# Patient Record
Sex: FEMALE | Race: OTHER
[De-identification: names, ages, dates, MRNs, and addresses within clinical notes are randomized per-mention and may not be internally consistent; named-entity substitution may affect disease eponyms.]

---

## 2017-03-02 NOTE — PDOC
History of Present Illness





- General


Chief Complaint: Bite


Stated Complaint: DOG BITE, LEG


Time Seen by Provider: 03/02/17 13:42


History Source: Patient


Exam Limitations: No Limitations





- History of Present Illness


Initial Comments: 





03/02/17 14:18


CC bitten by own dog today while playing with him


Occurred: reports: just prior to arrival


Severity: reports: mild


Pain Location: reports: lower extremity





Past History





- Past Medical History


Allergies/Adverse Reactions: 


 Allergies











Allergy/AdvReac Type Severity Reaction Status Date / Time


 


No Known Allergies Allergy   Verified 03/02/17 13:14














- Surgical History


Cholecystectomy: Yes





- Psycho/Social/Smoking Cessation Hx


Anxiety: No


Suicidal Ideation: No


Smoking History: Never smoked


Have you smoked in the past 12 months: No


Information on smoking cessation initiated: No


Hx Alcohol Use: No


Drug/Substance Use Hx: No


Substance Use Type: None





**Review of Systems





- Review of Systems


Constitutional: No: Chills, Fever, Malaise


HEENTM: No: Symptoms Reported


Respiratory: Yes: Cough


ABD/GI: No: Symptoms Reported


: No: Symptoms Reported


Musculoskeletal: No: Symptoms Reported


Integumentary: Yes: Other (large dog bite left prox calf)





*Physical Exam





- Vital Signs


 Last Vital Signs











Temp Pulse Resp BP Pulse Ox


 


 97.9 F   97 H  18   159/76   100 


 


 03/02/17 13:14  03/02/17 13:14  03/02/17 13:14  03/02/17 13:14  03/02/17 13:14














- Physical Exam


General Appearance: Yes: Appropriately Dressed.  No: Apparent Distress


HEENT: positive: Pharynx Normal


Neck: positive: Supple.  negative: Rigid


Respiratory/Chest: positive: Lungs Clear, Normal Breath Sounds


Extremity: positive: Other (10 cm x 1 cm wide ope laceration anterior/ medial 

calf proximal aspect)





Procedures





- Laceration/Wound Repair


  ** Left Anterior Medial Proximal Leg


Wound Length: 7.6 to 12.5 cm


Wound Explored: no foreign body present


Wound's Depth, Shape: linear, irregular


Irrigated w/ Saline: Yes


Betadine Prep: Yes


Anesthesia: 1% Lidocaine


Amount of Anesthetic (ccs): 10


Suture Size/Type: 4:0, nylon


Number of Sutures: 15


Layer Closure: No


Sterile Dressing Applied: Yes





ED Treatment Course





- ADDITIONAL ORDERS


Additional order review: 


 Laboratory  Results











  03/02/17





  13:52


 


Urine HCG, Qual  Negative














- RADIOLOGY


Radiology Studies Ordered: 














 Category Date Time Status


 


 LEG TIB/FIB-LEFT [RAD] Stat Radiology  03/02/17 13:52 Ordered














Medical Decision Making





- Medical Decision Making





03/02/17 15:44


laceration repair done; no fracture/ FB noted on xray; placed pressure drsg now

; will suggest crutch use x 3 days; wound check in 2  in ED; Tetanus and 

augmentin started in ED





*DC/Admit/Observation/Transfer


Diagnosis at time of Disposition: 


Laceration of lower extremity


Qualifiers:


 Encounter type: initial encounter Laterality: left Qualified Code(s): S81.812A 

- Laceration without foreign body, left lower leg, initial encounter





Dog bite of lower leg


Qualifiers:


 Encounter type: initial encounter Laterality: left Qualified Code(s): S81.852A 

- Open bite, left lower leg, initial encounter; W54.0XXA - Bitten by dog, 

initial encounter





- Discharge Dispostion


Disposition: HOME


Condition at time of disposition: Stable


Admit: No





- Patient Instructions


Additional Instructions: 


 tetanus good x 10 years; wound check in ED in 2-3 days; elevate leg at all 

times; use crutches until wound check; sutures out 14 days





- Post Discharge Activity


Work/School Note:  Back to Work

## 2017-03-05 NOTE — PDOC
History of Present Illness





- General


Chief Complaint: Revisit,Wound Recheck


Stated Complaint: FOLLOW UP, LEG PAIN


Time Seen by Provider: 03/05/17 19:37


History Source: Patient


Exam Limitations: No Limitations





- History of Present Illness


Initial Comments: 





03/05/17 20:19


41-year-old female sent here for wound check secondary to her dog bite that she 

sustained 3 days prior which required sutures, tetanus and is currently on 

antibiotics. Patient states no worsening pain drainage or swelling. Patient 

also denies fever.


Timing/Duration: resolved prior to arrival


Associated Symptoms: reports: denies symptoms





Past History





- Past Medical History


Allergies/Adverse Reactions: 


 Allergies











Allergy/AdvReac Type Severity Reaction Status Date / Time


 


No Known Allergies Allergy   Verified 03/02/17 13:14











Home Medications: 


Ambulatory Orders





Amoxicillin/Potassium Clav [Augmentin 875-125 Tablet] 1 each PO BID #14 tablet 

03/02/17 











- Surgical History


Cholecystectomy: Yes





- Psycho/Social/Smoking Cessation Hx


Anxiety: No


Suicidal Ideation: No


Smoking History: Never smoked


Have you smoked in the past 12 months: No


Information on smoking cessation initiated: No


Hx Alcohol Use: No


Drug/Substance Use Hx: No


Substance Use Type: None


Patient Lives Alone: No


Lives with/in: spouse/SO





**Review of Systems





- Review of Systems


Able to Perform ROS?: Yes


Constitutional: No: Symptoms Reported


ABD/GI: No: Nausea


Musculoskeletal: No: Symptoms Reported


Integumentary: No: Symptoms Reported


Neurological: No: Symptoms reported





*Physical Exam





- Vital Signs


 Last Vital Signs











Temp Pulse Resp BP Pulse Ox


 


 98.3 F   88   18   140/74   100 


 


 03/05/17 19:22  03/05/17 19:22  03/05/17 19:22  03/05/17 19:22  03/05/17 19:22














- Physical Exam


General Appearance: Yes: Nourished, Appropriately Dressed.  No: Apparent 

Distress


Vascular Pulses: Doralis-Pedis (L): 2+


Extremity: positive: Other (Noted mildly edematous and ecchymotic repaired 

laceration to upper in her left calf no palpable fluctuance no erythema and no 

increased warmth dry blood noted to dressing. )


Integumentary: positive: Swelling, Ecchymosis


Neurologic: positive: Motor Strength 5/5 (ambulatory with crutches)





Medical Decision Making





- Medical Decision Making





03/05/17 20:21


Patient here for wound check secondary to laceration repair after sustaining a 

dog bite 3 days prior. Patient states has had no symptoms, continued to take 

antibiotics but does not often apply ice or elevate extremity.


area redressed instructions given on laceration repair supportive care





*DC/Admit/Observation/Transfer


Diagnosis at time of Disposition: 


Dog bite of lower leg


Qualifiers:


 Encounter type: subsequent encounter Laterality: left Qualified Code(s): 

S81.852D - Open bite, left lower leg, subsequent encounter; W54.0XXD - Bitten 

by dog, subsequent encounter





- Discharge Dispostion


Disposition: HOME


Condition at time of disposition: Good





- Patient Instructions


Printed Discharge Instructions:  How to Care for a Domestic Animal Bite, DI for 

Laceration Repair -- Complex Suture


Additional Instructions: 


Please keep area clean and dry .


please elevated higher than your heart when not ambulatory.


Please apply ice to the affected area for the next 2 days.


Continue antibiotics.


Return here for suture removal as previously discussed

## 2017-03-16 NOTE — PDOC
Suture Removal/Wound Check HPI





- History of Present Illness


Chief Complaint: Suture/Staple Removal(Here)


Stated Complaint: SUTURE REMOVAL


Time Seen by Provider: 03/16/17 11:32


History Source: Yes: Patient


Exam Limitations: Yes: No Limitations


Treated at: [facility]


Date of Last ED visit: 03/05/17





- Previous ED Treatment


Type of procedure performed on last visit: Yes: Other (wound check)


Tetanus Immunization: Yes: Up to Date


Antibiotics Prescribed: Yes (augmentin)





- Onset of Previous Treatment


Date of Occurence: 03/02/17


Comment:: 





03/16/17 14:22


Was seen here originally on 03/02/2017 for dog bite to her right anterior 

medial calf and received 15 sutures and the patient was then seen 03/05/2017 

for wound check. Patient reports that she has not been cleansing her wound just 

applying bacitracin twice a day and has been covering it constantly with a 

dressing not letting it air out. Denies any fever. Or any tenderness of wound 

or discharge from wound





Past History





- Past Medical History


Allergies/Adverse Reactions: 


Allergies





No Known Allergies Allergy (Verified 03/16/17 11:17)


 








Home Medications: 


Ambulatory Orders





Amoxicillin/Potassium Clav [Augmentin 875-125 Tablet] 1 each PO BID #14 tablet 

03/02/17 








General: Yes: no pertinent history





- Social History


Smoking Status: Never smoked





Suture Removal/Wound Check PE





- Physical Exam


Laceration/Wound Check Symptoms: reports: Other Comment (see under comments)


Comments: 


03/16/17 12:24


wound on left calf with 15 sutures that were placed on 03/02/2017 due to a dog 

bite by her own dog that was up-to-date with immunizations. Patient reports 

that she has not been cleaning the wound with any soap and water has just been 

applying bacitracin twice a day and been keeping the wound covered at all 

times. Wound appears moist cleansed it with Betadine and normal saline 0.9% 

removed 1 interrupted suture that was slightly medial to left of wound edge, 

wound edge was not well approximated slight gap noted and removed one other 

interrupted suture at the medial and of wound wound edge approximated. Applied 

Steri-Strips to areas where sutures were removed stopped removing sutures as 

wound does not appear that it is well-healed due to being moist and covered at 

all times. No erythema noted along wound edges no signs of infection. We'll 

give the patient will instructions and have her come back in 3 days first 

suture removal. Or 13 sutures remaining Telfa applied including





03/16/17 12:27








03/16/17 12:28





Current Severity Level: Mild


Pain Localization: None


Location of Laceration/Wound: left: Leg (anterior calf )





*Review of Systems





- Review of Systems


Constitutional: No: Symptoms Reported


HEENTM: No: Symptoms Reported


Respiratory: No: Symptoms reported


Cardiac (ROS): No: Symptoms Reported


ABD/GI: No: Symptoms Reported


: No: Symptoms Reported


Integumentary: Yes: Other (With 15 sutures left anterior/medial calf, )


Neurological: No: Symptoms reported





Procedures





- Consent


Consent obtained: From Patient





- Additional Procedures


Progress: 





03/16/17 12:28








wound on left calf with 15 sutures that were placed on 03/02/2017 due to a dog 

bite by her own dog that was up-to-date with immunizations. Patient reports 

that she has not been cleaning the wound with any soap and water has just been 

applying bacitracin twice a day and been keeping the wound covered at all 

times. Wound appears moist cleansed it with Betadine and normal saline 0.9% 

removed 1 interrupted suture that was slightly medial to left of wound edge, 

wound edge was not well approximated slight gap noted and removed one other 

interrupted suture at the medial and of wound wound edge approximated. Applied 

Steri-Strips to areas where sutures were removed stopped removing sutures as 

wound does not appear that it is well-healed due to being moist and covered at 

all times. No erythema noted along wound edges no signs of infection. We'll 

give the patient will instructions and have her come back in 3 days first 

suture removal.  13 sutures remaining Telfa applied including maria r 








Medical Decision Making





- Medical Decision Making





03/16/17 12:29











Patient here for suture removal of left anterior medial calf left leg attempted 

to remove 2 sutures wound edges on at one suture removal was not well 

approximated will have patient come back in 3 days for further suture removal 

with proper instructions on wound care





*DC/Admit/Observation/Transfer


Diagnosis at time of Disposition: 


 Encounter for wound re-check





- Discharge Dispostion


Disposition: HOME


Condition at time of disposition: Stable





- Referrals


Referrals: 


Car Kiran MD [Primary Care Provider] - 





- Patient Instructions


Additional Instructions: 











Wound on left leg with antibacterial soap and water pat dry twice daily and 

apply a Steri-Strip at 2 areas where sutures were removed only apply bacitracin 

to wound in the morning wound air out when at home and especially at night when 

sleeping,. Cover wounds with dressing when out of house only











Return here in 3 days and check and possible further suture removal














She voiced understanding of discharge instructions and all questions were 

answered

## 2017-03-20 NOTE — PDOC
Suture Removal/Wound Check HPI





- History of Present Illness


Chief Complaint: Suture/Staple Removal(Here)


Stated Complaint: STITCHES REMOVAL


Time Seen by Provider: 03/20/17 21:20


History Source: Yes: Patient


Exam Limitations: Yes: No Limitations


Date of Last ED visit: 03/05/17





Past History





- Past Medical History


Allergies/Adverse Reactions: 


Allergies





No Known Allergies Allergy (Verified 03/20/17 21:20)


 








Home Medications: 


Ambulatory Orders





Amoxicillin/Potassium Clav [Augmentin 875-125 Tablet] 1 each PO BID #14 tablet 

03/02/17 











- Social History


Smoking Status: Never smoked





Medical Decision Making





- Medical Decision Making


A/P:  40 y/o female with 14 sutures placed into left lower leg on 3/2 with 2 

sutures removed on 3/16 here for the rest of the sutures to be removed.  12 

sutures removed.  Wound healing well.  Still some space in between margins so 

steri strips applied.  Pt instructed to keep area clean and dry. 














*DC/Admit/Observation/Transfer


Diagnosis at time of Disposition: 


 Visit for suture removal





- Discharge Dispostion


Disposition: HOME


Condition at time of disposition: Improved





- Referrals


Referrals: 


Car Kiran MD [Primary Care Provider] - 





- Patient Instructions


Printed Discharge Instructions:  DI for Suture Removal


Additional Instructions: 


Discharge Instructions:


-Steri strips will fall off on their own


-Keep area clean and dry

## 2023-05-18 ENCOUNTER — HOSPITAL ENCOUNTER (INPATIENT)
Dept: HOSPITAL 74 - JER | Age: 48
Discharge: HOME | DRG: 744 | End: 2023-05-18
Attending: OBSTETRICS & GYNECOLOGY | Admitting: OBSTETRICS & GYNECOLOGY
Payer: COMMERCIAL

## 2023-05-18 VITALS — BODY MASS INDEX: 31.8 KG/M2

## 2023-05-18 VITALS
SYSTOLIC BLOOD PRESSURE: 106 MMHG | HEART RATE: 72 BPM | DIASTOLIC BLOOD PRESSURE: 70 MMHG | TEMPERATURE: 98.6 F | RESPIRATION RATE: 18 BRPM

## 2023-05-18 DIAGNOSIS — D62: ICD-10-CM

## 2023-05-18 DIAGNOSIS — N92.4: Primary | ICD-10-CM

## 2023-05-18 LAB
ALBUMIN SERPL-MCNC: 3.3 G/DL (ref 3.4–5)
ALP SERPL-CCNC: 54 U/L (ref 45–117)
ALT SERPL-CCNC: 35 U/L (ref 13–61)
ANION GAP SERPL CALC-SCNC: 2 MMOL/L (ref 8–16)
APPEARANCE UR: (no result)
APTT BLD: 28.5 SECONDS (ref 25.2–36.5)
AST SERPL-CCNC: 22 U/L (ref 15–37)
BACTERIA # UR AUTO: 7 /UL (ref 0–1359)
BASOPHILS # BLD: 0.2 % (ref 0–2)
BASOPHILS # BLD: 0.5 % (ref 0–2)
BILIRUB SERPL-MCNC: 0.3 MG/DL (ref 0.2–1)
BILIRUB UR STRIP.AUTO-MCNC: (no result) MG/DL
BUN SERPL-MCNC: 9.1 MG/DL (ref 7–18)
CALCIUM SERPL-MCNC: 8.1 MG/DL (ref 8.5–10.1)
CASTS URNS QL MICRO: 1 /UL (ref 0–3.1)
CHLORIDE SERPL-SCNC: 112 MMOL/L (ref 98–107)
CO2 SERPL-SCNC: 26 MMOL/L (ref 21–32)
COLOR UR: (no result)
CREAT SERPL-MCNC: 0.5 MG/DL (ref 0.55–1.3)
DEPRECATED RDW RBC AUTO: 14.4 % (ref 11.6–15.6)
DEPRECATED RDW RBC AUTO: 14.4 % (ref 11.6–15.6)
EOSINOPHIL # BLD: 0 % (ref 0–4.5)
EOSINOPHIL # BLD: 1 % (ref 0–4.5)
EPITH CASTS URNS QL MICRO: 16 /UL (ref 0–25.1)
GLUCOSE SERPL-MCNC: 101 MG/DL (ref 74–106)
HCT VFR BLD CALC: 18.8 % (ref 32.4–45.2)
HCT VFR BLD CALC: 29.3 % (ref 32.4–45.2)
HGB BLD-MCNC: 6.5 GM/DL (ref 10.7–15.3)
HGB BLD-MCNC: 9.8 GM/DL (ref 10.7–15.3)
INR BLD: 0.94 (ref 0.83–1.09)
KETONES UR QL STRIP: NEGATIVE
LEUKOCYTE ESTERASE UR QL STRIP.AUTO: (no result)
LYMPHOCYTES # BLD: 10.4 % (ref 8–40)
LYMPHOCYTES # BLD: 43.3 % (ref 8–40)
MCH RBC QN AUTO: 28.5 PG (ref 25.7–33.7)
MCH RBC QN AUTO: 29.3 PG (ref 25.7–33.7)
MCHC RBC AUTO-ENTMCNC: 33.6 G/DL (ref 32–36)
MCHC RBC AUTO-ENTMCNC: 34.7 G/DL (ref 32–36)
MCV RBC: 84.3 FL (ref 80–96)
MCV RBC: 84.6 FL (ref 80–96)
MONOCYTES # BLD AUTO: 1.4 % (ref 3.8–10.2)
MONOCYTES # BLD AUTO: 4.7 % (ref 3.8–10.2)
NEUTROPHILS # BLD: 50.5 % (ref 42.8–82.8)
NEUTROPHILS # BLD: 88 % (ref 42.8–82.8)
NITRITE UR QL STRIP: POSITIVE
PH UR: 5 [PH] (ref 5–8)
PLATELET # BLD AUTO: 360 10^3/UL (ref 134–434)
PLATELET # BLD AUTO: 447 10^3/UL (ref 134–434)
PMV BLD: 7.5 FL (ref 7.5–11.1)
PMV BLD: 8.1 FL (ref 7.5–11.1)
POTASSIUM SERPLBLD-SCNC: 4.5 MMOL/L (ref 3.5–5.1)
PROT SERPL-MCNC: 6.4 G/DL (ref 6.4–8.2)
PROT UR QL STRIP: (no result)
PROT UR QL STRIP: NEGATIVE
PT PNL PPP: 10.9 SEC (ref 9.7–13)
RBC # BLD AUTO: (no result) /UL (ref 0–23.9)
RBC # BLD AUTO: 2.23 M/MM3 (ref 3.6–5.2)
RBC # BLD AUTO: 3.46 M/MM3 (ref 3.6–5.2)
SODIUM SERPL-SCNC: 140 MMOL/L (ref 136–145)
SP GR UR: 1.02 (ref 1.01–1.03)
UROBILINOGEN UR STRIP-MCNC: 0.2 MG/DL (ref 0.2–1)
WBC # BLD AUTO: 11.1 K/MM3 (ref 4–10)
WBC # BLD AUTO: 5.9 K/MM3 (ref 4–10)
WBC # UR AUTO: 63 /UL (ref 0–25.8)

## 2023-05-18 PROCEDURE — 0UDB8ZZ EXTRACTION OF ENDOMETRIUM, VIA NATURAL OR ARTIFICIAL OPENING ENDOSCOPIC: ICD-10-PCS | Performed by: OBSTETRICS & GYNECOLOGY

## 2023-05-18 PROCEDURE — 0UJD8ZZ INSPECTION OF UTERUS AND CERVIX, VIA NATURAL OR ARTIFICIAL OPENING ENDOSCOPIC: ICD-10-PCS | Performed by: OBSTETRICS & GYNECOLOGY

## 2023-05-18 PROCEDURE — 30233N1 TRANSFUSION OF NONAUTOLOGOUS RED BLOOD CELLS INTO PERIPHERAL VEIN, PERCUTANEOUS APPROACH: ICD-10-PCS | Performed by: OBSTETRICS & GYNECOLOGY

## 2023-05-18 PROCEDURE — P9058 RBC, L/R, CMV-NEG, IRRAD: HCPCS
